# Patient Record
Sex: MALE | Race: WHITE | NOT HISPANIC OR LATINO | Employment: STUDENT | URBAN - METROPOLITAN AREA
[De-identification: names, ages, dates, MRNs, and addresses within clinical notes are randomized per-mention and may not be internally consistent; named-entity substitution may affect disease eponyms.]

---
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
## 2023-05-22 ENCOUNTER — TELEPHONE (OUTPATIENT)
Dept: PEDIATRICS | Facility: CLINIC | Age: 18
End: 2023-05-22

## 2023-05-22 NOTE — TELEPHONE ENCOUNTER
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 She will speak with Marshall about his work schedule and have him schedule online.

## 2023-08-11 ENCOUNTER — OFFICE VISIT (OUTPATIENT)
Dept: PEDIATRICS | Facility: CLINIC | Age: 18
End: 2023-08-11
Payer: COMMERCIAL

## 2023-08-11 VITALS
OXYGEN SATURATION: 98 % | SYSTOLIC BLOOD PRESSURE: 102 MMHG | DIASTOLIC BLOOD PRESSURE: 62 MMHG | BODY MASS INDEX: 19.4 KG/M2 | HEART RATE: 75 BPM | HEIGHT: 72 IN | RESPIRATION RATE: 18 BRPM | WEIGHT: 143.25 LBS | TEMPERATURE: 98 F

## 2023-08-11 DIAGNOSIS — Z23 IMMUNIZATION DUE: ICD-10-CM

## 2023-08-11 DIAGNOSIS — Z82.49 FAMILY HISTORY OF HEART DISEASE: ICD-10-CM

## 2023-08-11 DIAGNOSIS — Z00.00 ENCOUNTER FOR WELL ADULT EXAM WITHOUT ABNORMAL FINDINGS: Primary | ICD-10-CM

## 2023-08-11 PROCEDURE — 3078F PR MOST RECENT DIASTOLIC BLOOD PRESSURE < 80 MM HG: ICD-10-PCS | Mod: CPTII,S$GLB,, | Performed by: PEDIATRICS

## 2023-08-11 PROCEDURE — 90471 TDAP VACCINE GREATER THAN OR EQUAL TO 7YO IM: ICD-10-PCS | Mod: S$GLB,,, | Performed by: PEDIATRICS

## 2023-08-11 PROCEDURE — 1160F RVW MEDS BY RX/DR IN RCRD: CPT | Mod: CPTII,S$GLB,, | Performed by: PEDIATRICS

## 2023-08-11 PROCEDURE — 1159F PR MEDICATION LIST DOCUMENTED IN MEDICAL RECORD: ICD-10-PCS | Mod: CPTII,S$GLB,, | Performed by: PEDIATRICS

## 2023-08-11 PROCEDURE — 3008F PR BODY MASS INDEX (BMI) DOCUMENTED: ICD-10-PCS | Mod: CPTII,S$GLB,, | Performed by: PEDIATRICS

## 2023-08-11 PROCEDURE — 90715 TDAP VACCINE 7 YRS/> IM: CPT | Mod: S$GLB,,, | Performed by: PEDIATRICS

## 2023-08-11 PROCEDURE — 3074F PR MOST RECENT SYSTOLIC BLOOD PRESSURE < 130 MM HG: ICD-10-PCS | Mod: CPTII,S$GLB,, | Performed by: PEDIATRICS

## 2023-08-11 PROCEDURE — 1159F MED LIST DOCD IN RCRD: CPT | Mod: CPTII,S$GLB,, | Performed by: PEDIATRICS

## 2023-08-11 PROCEDURE — 3008F BODY MASS INDEX DOCD: CPT | Mod: CPTII,S$GLB,, | Performed by: PEDIATRICS

## 2023-08-11 PROCEDURE — 3074F SYST BP LT 130 MM HG: CPT | Mod: CPTII,S$GLB,, | Performed by: PEDIATRICS

## 2023-08-11 PROCEDURE — 99395 PREV VISIT EST AGE 18-39: CPT | Mod: 25,S$GLB,, | Performed by: PEDIATRICS

## 2023-08-11 PROCEDURE — 1160F PR REVIEW ALL MEDS BY PRESCRIBER/CLIN PHARMACIST DOCUMENTED: ICD-10-PCS | Mod: CPTII,S$GLB,, | Performed by: PEDIATRICS

## 2023-08-11 PROCEDURE — 90471 IMMUNIZATION ADMIN: CPT | Mod: S$GLB,,, | Performed by: PEDIATRICS

## 2023-08-11 PROCEDURE — 99395 PR PREVENTIVE VISIT,EST,18-39: ICD-10-PCS | Mod: 25,S$GLB,, | Performed by: PEDIATRICS

## 2023-08-11 PROCEDURE — 90715 TDAP VACCINE GREATER THAN OR EQUAL TO 7YO IM: ICD-10-PCS | Mod: S$GLB,,, | Performed by: PEDIATRICS

## 2023-08-11 PROCEDURE — 3078F DIAST BP <80 MM HG: CPT | Mod: CPTII,S$GLB,, | Performed by: PEDIATRICS

## 2023-08-11 NOTE — PROGRESS NOTES
Chief Complaint   Patient presents with    Well Child       Marshall Rosales is a 17 y.o. patient presenting for young adult well visit.  he  is seen today alone.  He has concerns that his pectus excavatum which was repaired years ago is getting a little worse and wonders if his posture with college studying etc. is contributing to this.  Wanting to know about nutrition and exercises to help him put on muscle in his chest area and put on muscle in general    PMH:   Past Medical History:   Diagnosis Date    Asthma in pediatric patient 8/21/2014    Mostly exercise induced     Exercise-induced bronchospasm 8/21/2014   Family history significant for dad having high cholesterol that is managed by diet alone    PHQ9 8/7/2019   Little interest or pleasure in doing things: Not at all   Feeling down, depressed or hopeless: Not at all   Trouble falling asleep, staying asleep, or sleeping too much: Not at all   Feeling tired or having little energy: Not at all   Poor appetite or overeating: Not at all   Feeling bad about yourself - or that you are a failure or have let yourself or family down: Not at all   Trouble concentrating on things, such as reading the newspaper or watching television: Not at all   Moving or speaking so slowly that other people could have noticed. Or the opposite,being so fidgety or restless that you have been moving around a lot more than usual: Not at all   Thoughts that you would be better off dead or hurting yourself in some way: Not at all   If you indicated you have experienced any of the previous problems, how difficult have these problems made it for you to do your work, take care of things at home or get along with other people? Not difficult at all   Total Score 0         ROS: Review of Systems   Constitutional:  Negative for fever.   HENT:  Negative for congestion and sore throat.    Eyes:  Negative for discharge and redness.   Respiratory:  Negative for cough and wheezing.    Cardiovascular:   "Negative for chest pain and palpitations.   Gastrointestinal:  Negative for constipation, diarrhea and vomiting.   Genitourinary:  Negative for hematuria.   Skin:  Negative for rash.   Neurological:  Negative for headaches.   Answers submitted by the patient for this visit:  Well Child Development Questionnaire (Submitted on 8/11/2023)  activity change: No  appetite change : No  mouth sores: No  difficulty urinating: No  wound: No  behavior problem: No  sleep disturbance: No  syncope: No      No problems during sports participation in the past.   Social History: In college. Denies the use of tobacco, alcohol or street drugs.  Sexual history: not sexually active    OBJECTIVE:   /62 (BP Location: Right arm, Patient Position: Sitting, BP Method: Large (Manual))   Pulse 75   Temp 97.7 °F (36.5 °C) (Oral)   Resp 18   Ht 6' 0.44" (1.84 m)   Wt 65 kg (143 lb 4 oz)   SpO2 98%   BMI 19.19 kg/m²     General appearance: WDWN male  ENT: ears and throat normal  Eyes: PERRLA,   Neck: supple, thyroid normal, no adenopathy  Lungs:  clear, no wheezing or rales; chest wall with minimal central lower pectus excavatum, no hyperdynamic movement of the chest wall  Heart: no murmur, regular rate and rhythm, normal S1 and S2  Abdomen: no masses palpated, no organomegaly or tenderness  Genitalia: genitalia not examined  Spine: normal, no scoliosis  Skin: Normal with no acne noted.  Neuro: normal  Extremities: normal    ASSESSMENT:   1. Encounter for well adult exam without abnormal findings        2. Family history of heart disease  Lipid panel    Lipid panel      3. Immunization due  (In Office Administered) Tdap Vaccine              PLAN: Vaccines reviewed.  Counseling: nutrition, safety, smoking, alcohol, drugs, puberty,  peer interaction, sexual education, exercise, preconditioning for  sports. Acne treatment discussed. Cleared for school and sports activities.  Marshall was seen today for well child.    Diagnoses and all " orders for this visit:    Encounter for well adult exam without abnormal findings    Family history of heart disease  -     Lipid panel; Future  -     Lipid panel    Immunization due  -     (In Office Administered) Tdap Vaccine

## 2023-08-11 NOTE — PATIENT INSTRUCTIONS
[address]  [phone]  Dr Leone  -  Dr Faby Christian    [facility] [address]    Dr Higinio Nickerson NP    [facility]/internal med  [address]      [phone]